# Patient Record
Sex: FEMALE | Race: WHITE | NOT HISPANIC OR LATINO | URBAN - METROPOLITAN AREA
[De-identification: names, ages, dates, MRNs, and addresses within clinical notes are randomized per-mention and may not be internally consistent; named-entity substitution may affect disease eponyms.]

---

## 2024-05-22 ENCOUNTER — TELEPHONE (OUTPATIENT)
Dept: CARDIOLOGY | Facility: HOSPITAL | Age: 69
End: 2024-05-22

## 2024-09-02 ENCOUNTER — HOSPITAL ENCOUNTER (EMERGENCY)
Facility: HOSPITAL | Age: 69
Discharge: HOME | End: 2024-09-02
Payer: COMMERCIAL

## 2024-09-02 VITALS
DIASTOLIC BLOOD PRESSURE: 83 MMHG | HEIGHT: 66 IN | HEART RATE: 87 BPM | TEMPERATURE: 98.2 F | OXYGEN SATURATION: 94 % | SYSTOLIC BLOOD PRESSURE: 152 MMHG | BODY MASS INDEX: 19.29 KG/M2 | RESPIRATION RATE: 18 BRPM | WEIGHT: 120 LBS

## 2024-09-02 DIAGNOSIS — S61.219A FINGER LACERATION, INITIAL ENCOUNTER: Primary | ICD-10-CM

## 2024-09-02 PROBLEM — H52.11 MYOPIA OF RIGHT EYE: Status: ACTIVE | Noted: 2024-09-02

## 2024-09-02 PROBLEM — E03.9 HYPOTHYROIDISM: Status: ACTIVE | Noted: 2024-09-02

## 2024-09-02 PROBLEM — N81.6 RECTOCELE: Status: ACTIVE | Noted: 2021-09-07

## 2024-09-02 PROBLEM — M12.9 ARTHROPATHY: Status: ACTIVE | Noted: 2024-09-02

## 2024-09-02 PROBLEM — L80 VITILIGO: Status: ACTIVE | Noted: 2024-09-02

## 2024-09-02 PROBLEM — R79.89 LOW VITAMIN D LEVEL: Status: ACTIVE | Noted: 2024-09-02

## 2024-09-02 PROBLEM — M85.80 OSTEOPENIA: Status: ACTIVE | Noted: 2024-09-02

## 2024-09-02 PROBLEM — E06.3 HASHIMOTO'S THYROIDITIS: Status: ACTIVE | Noted: 2024-09-02

## 2024-09-02 PROCEDURE — 99281 EMR DPT VST MAYX REQ PHY/QHP: CPT

## 2024-09-02 ASSESSMENT — COLUMBIA-SUICIDE SEVERITY RATING SCALE - C-SSRS
1. IN THE PAST MONTH, HAVE YOU WISHED YOU WERE DEAD OR WISHED YOU COULD GO TO SLEEP AND NOT WAKE UP?: NO
2. HAVE YOU ACTUALLY HAD ANY THOUGHTS OF KILLING YOURSELF?: NO
6. HAVE YOU EVER DONE ANYTHING, STARTED TO DO ANYTHING, OR PREPARED TO DO ANYTHING TO END YOUR LIFE?: NO

## 2024-09-02 NOTE — ED PROVIDER NOTES
HPI   Chief Complaint   Patient presents with    Laceration       Patient is a healthy nontoxic-appearing 69-year-old female with past medical history of arthropathy, basal cell carcinoma, Hashimoto's thyroiditis, MD deficiency, osteopenia, rectocele, vitiligo, presents to the emergency room today for complaint of finger laceration.  Patient states yesterday about 7:30 at night she was cutting her ribs when the knife accidentally slipped causing a laceration to the left index finger.  Patient is right-hand dominant.  Patient denies any bleeding or drainage at the time however states given fairly large laceration she wanted make sure she did not need stitches.  Patient denies any numbness or tingling.  Patient denies any chest pain or shortness of breath difficulty breathing, lightheadedness, dizziness, abdominal pain with nausea or vomiting, fever, shaking, or chills.  Patient states tetanus immunization is up-to-date.              Patient History   Past Medical History:   Diagnosis Date    Personal history of other diseases of the female genital tract     History of ovarian cyst     Past Surgical History:   Procedure Laterality Date    OOPHORECTOMY  05/17/2016    Oophorectomy     No family history on file.  Social History     Tobacco Use    Smoking status: Not on file    Smokeless tobacco: Not on file   Substance Use Topics    Alcohol use: Not on file    Drug use: Not on file       Physical Exam   ED Triage Vitals [09/02/24 1747]   Temperature Heart Rate Respirations BP   36.8 °C (98.2 °F) 87 18 152/83      Pulse Ox Temp src Heart Rate Source Patient Position   94 % -- -- --      BP Location FiO2 (%)     -- --       Physical Exam  Vitals and nursing note reviewed.   Constitutional:       General: She is not in acute distress.     Appearance: Normal appearance. She is not ill-appearing, toxic-appearing or diaphoretic.   HENT:      Head: Normocephalic.   Eyes:      General:         Right eye: No discharge.          Left eye: No discharge.      Extraocular Movements: Extraocular movements intact.      Pupils: Pupils are equal, round, and reactive to light.   Cardiovascular:      Rate and Rhythm: Normal rate and regular rhythm.      Pulses: Normal pulses.      Heart sounds: Normal heart sounds. No murmur heard.     No friction rub. No gallop.   Pulmonary:      Effort: Pulmonary effort is normal. No respiratory distress.      Breath sounds: Normal breath sounds. No stridor. No wheezing, rhonchi or rales.   Chest:      Chest wall: No tenderness.   Musculoskeletal:         General: No swelling, tenderness, deformity or signs of injury. Normal range of motion.      Cervical back: Normal range of motion and neck supple.      Right lower leg: No edema.      Left lower leg: No edema.   Skin:     General: Skin is warm.      Capillary Refill: Capillary refill takes less than 2 seconds.      Coloration: Skin is not jaundiced or pale.      Findings: No bruising, erythema, lesion or rash.      Comments: 2 cm linear laceration to the dorsal surface of the left index finger over the PIP joint, no bleeding or drainage present, able to flex extend all digits without any difficulty, cap refill less than 3 seconds, hand grasp strong and regular, radial pulses strong and regular.   Neurological:      General: No focal deficit present.      Mental Status: She is alert and oriented to person, place, and time.           ED Course & MDM   Diagnoses as of 09/02/24 1835   Finger laceration, initial encounter                 No data recorded     [place] Coma Scale Score: 15 (09/02/24 1747 : Lennox Sutton, EMT)                           Medical Decision Making  Given patient's complaint presentation a thorough exam was performed.  On exam patient has 2 cm linear laceration to the dorsal surface of the left index finger over the PIP joint, no bleeding or drainage present, able to flex extend all digits without any difficulty, cap refill less than 3  seconds, hand grasp strong and regular, radial pulses strong and regular.  Remains hemodynamically stable during emergency evaluation, is afebrile, have a low suspicion for underlying osseous abnormality given superficial nature of laceration, vascular compromise, flexor or extensor tendon injury.  Given duration of initial laceration I do not believe sutures are warranted at this time.  Steri-Strips were applied with Mastisol after the area was cleansed.  Dry sterile dressing was applied and patient was placed in aluminum finger splint.  I encouraged monitoring symptoms, if they become worse was given strict precautions to return to emergency room for further evaluation, otherwise follow up with the primary care provider as needed.  Patient was agreeable with this plan and discharged home in stable condition.      IZA Davidson     Portions of this note were generated using digital voice recognition software, and may contain grammatical errors      Procedure  Procedures     IZA Davidson  09/02/24 5588